# Patient Record
Sex: MALE | Race: WHITE | NOT HISPANIC OR LATINO | URBAN - METROPOLITAN AREA
[De-identification: names, ages, dates, MRNs, and addresses within clinical notes are randomized per-mention and may not be internally consistent; named-entity substitution may affect disease eponyms.]

---

## 2018-11-06 ENCOUNTER — HISTORICAL (OUTPATIENT)
Dept: ADMINISTRATIVE | Facility: HOSPITAL | Age: 58
End: 2018-11-06

## 2018-11-06 LAB
ALBUMIN SERPL-MCNC: 4.4 GM/DL (ref 3.4–5)
ALBUMIN/GLOB SERPL: 1.63 {RATIO} (ref 1.5–2.5)
ALP SERPL-CCNC: 55 UNIT/L (ref 38–126)
ALT SERPL-CCNC: 27 UNIT/L (ref 7–52)
AST SERPL-CCNC: 23 UNIT/L (ref 15–37)
BILIRUB SERPL-MCNC: 0.8 MG/DL (ref 0.2–1)
BILIRUBIN DIRECT+TOT PNL SERPL-MCNC: 0.2 MG/DL (ref 0–0.5)
BILIRUBIN DIRECT+TOT PNL SERPL-MCNC: 0.6 MG/DL
BUN SERPL-MCNC: 26 MG/DL (ref 7–18)
CALCIUM SERPL-MCNC: 9 MG/DL (ref 8.5–10)
CHLORIDE SERPL-SCNC: 102 MMOL/L (ref 98–107)
CHOLEST SERPL-MCNC: 178 MG/DL (ref 0–200)
CHOLEST/HDLC SERPL: 2.3 {RATIO}
CO2 SERPL-SCNC: 31 MMOL/L (ref 21–32)
CREAT SERPL-MCNC: 1.31 MG/DL (ref 0.6–1.3)
GLOBULIN SER-MCNC: 2.7 GM/DL (ref 1.2–3)
GLUCOSE SERPL-MCNC: 110 MG/DL (ref 74–106)
HDLC SERPL-MCNC: 78 MG/DL (ref 35–60)
LDLC SERPL CALC-MCNC: 75 MG/DL (ref 0–129)
POTASSIUM SERPL-SCNC: 4.6 MMOL/L (ref 3.5–5.1)
PROT SERPL-MCNC: 7.1 GM/DL (ref 6.4–8.2)
PSA SERPL-MCNC: 0.14 NG/ML (ref 0–3.5)
SODIUM SERPL-SCNC: 138 MMOL/L (ref 136–145)
TRIGL SERPL-MCNC: 81 MG/DL (ref 30–150)
VLDLC SERPL CALC-MCNC: 16.2 MG/DL

## 2021-05-04 LAB — CRC RECOMMENDATION EXT: NORMAL

## 2021-08-10 ENCOUNTER — HISTORICAL (OUTPATIENT)
Dept: ADMINISTRATIVE | Facility: HOSPITAL | Age: 61
End: 2021-08-10

## 2021-08-10 LAB
ALBUMIN SERPL-MCNC: 4.5 GM/DL (ref 3.4–5)
ALBUMIN/GLOB SERPL: 1.67 {RATIO} (ref 1.5–2.5)
ALP SERPL-CCNC: 65 UNIT/L (ref 38–126)
ALT SERPL-CCNC: 17 UNIT/L (ref 7–52)
AST SERPL-CCNC: 16 UNIT/L (ref 15–37)
BILIRUB SERPL-MCNC: 0.7 MG/DL (ref 0.2–1)
BILIRUBIN DIRECT+TOT PNL SERPL-MCNC: 0.2 MG/DL (ref 0–0.5)
BILIRUBIN DIRECT+TOT PNL SERPL-MCNC: 0.5 MG/DL
BUN SERPL-MCNC: 25 MG/DL (ref 7–18)
CALCIUM SERPL-MCNC: 9.8 MG/DL (ref 8.5–10.1)
CHLORIDE SERPL-SCNC: 103 MMOL/L (ref 98–107)
CO2 SERPL-SCNC: 28 MMOL/L (ref 21–32)
CREAT SERPL-MCNC: 1.41 MG/DL (ref 0.6–1.3)
EST CREAT CLEARANCE SER (OHS): 59.44 ML/MIN
GLOBULIN SER-MCNC: 2.7 GM/DL (ref 1.2–3)
GLUCOSE SERPL-MCNC: 98 MG/DL (ref 74–106)
POTASSIUM SERPL-SCNC: 4.5 MMOL/L (ref 3.5–5.1)
PROT SERPL-MCNC: 7.2 GM/DL (ref 6.4–8.2)
SODIUM SERPL-SCNC: 139 MMOL/L (ref 136–145)

## 2022-04-07 ENCOUNTER — HISTORICAL (OUTPATIENT)
Dept: ADMINISTRATIVE | Facility: HOSPITAL | Age: 62
End: 2022-04-07

## 2022-04-23 VITALS
BODY MASS INDEX: 28.04 KG/M2 | DIASTOLIC BLOOD PRESSURE: 88 MMHG | HEIGHT: 64 IN | SYSTOLIC BLOOD PRESSURE: 138 MMHG | WEIGHT: 164.25 LBS

## 2022-04-27 ENCOUNTER — HISTORICAL (OUTPATIENT)
Dept: ADMINISTRATIVE | Facility: HOSPITAL | Age: 62
End: 2022-04-27

## 2022-04-27 LAB — VIT B12 SERPL-MCNC: 816 PG/ML (ref 213–816)

## 2022-05-01 NOTE — HISTORICAL [facility] CERNER
This is a historical note converted from [place]. Formatting and pictures may have been removed.  Please reference [place] for original formatting and attached multimedia. Chief Complaint  6 month recheck  History of Present Illness  60-year-old  male presents?to office today for 6-month recheck of chronic conditions including hyperlipidemia.? Also coronary artery calcium score?less than 100.? Reports 100% compliance with all prescribed medications. ?Denies any side effects as adverse reaction/intolerance. ?Medications are working well/as intended. ?Patient ?is receiving desired effects. ?Attempt to limit excess?caloric?and simple, hydration daily dietary intake.? Reports physical activity/physical exercise?on most days.? Typically?1 hour?or more greater.  Review of Systems  ?14 point Review of Systems performed with no exceptions for new complaints other than as noted in HPI.  Physical Exam  Vitals & Measurements  HR:?78(Peripheral)? BP:?138/88?  HT:?162.56?cm? HT:?162.56?cm? WT:?74.5?kg? WT:?74.500?kg? BMI:?28.19?  Well developed, well nourished, NAD, alert and oriented x4  Vitals reviewed  Head: NCAT  Neck: supple, FROM, no LA, no thyromegaly, no bruits auscultated  CV: RRR no MRG, peripheral pulses intact  Pulmonary: Effort normal and breath sounds normal, no wheeze  Abdomen: soft, NTND, no HSM; ? NABS; no peritoneal signs ? ??  Musculoskeletal: ?no tenderness, joints wnl for acute changes, FROM, no CCE  Skin: warm, dry, intact  Neuro: no motor/sensory deficits, cranial nerves grossly intact, cerebellar function appears intact  Lymphadenopathy: no abnormalities noted  Psychiatric: Cooperative, appropriate mood and affect, appears to have normal judgment  ?  Assessment/Plan  1.?HLD (hyperlipidemia)?E78.5  Well-controlled on current medication regimen. ?Continue as prescribed.? Check fasting lipid panel?and CMP  Ordered:  Comprehensive Metabolic Panel, Routine collect, 08/10/21 7:55:00 CDT, Blood, Order for  future visit, Stop date 08/10/21 7:55:00 CDT, Lab Collect, HLD (hyperlipidemia), 08/10/21 7:55:00 CDT  Hepatic Function Panel:, Routine collect, 08/10/21 7:55:00 CDT, Blood, Order for future visit, Stop date 08/10/21 7:55:00 CDT, Lab Collect, HLD (hyperlipidemia), 08/10/21 7:55:00 CDT  Lab Collection Request, 08/10/21 7:55:00 CDT, [facility], 08/10/21 7:55:00 CDT, HLD (hyperlipidemia)  Office/Outpatient Visit Level 3 Established [id number] PC, HLD (hyperlipidemia)  Agatston CAC score, <100, [facility], 08/10/21 7:55:00 CDT  ?  2.?Agatston CAC score, <100?R93.1  ?Recommend 3-4 days of 30-45 minutes sessions of brisk walking/water aerobics/resistance training as tolerating. ?Recommend limiting excess simple carbohydrates from daily dietary regimen. recommend increase lean protein and vegetable matter.  Ordered:  Office/Outpatient Visit Level 3 Established [id number] PC, HLD (hyperlipidemia)  Agatston CAC score, <100, [facility], 08/10/21 7:55:00 CDT  ?  Orders:  Clinic Follow up, *Est. 02/10/22 3:00:00 CST, Order for future visit, Wellness examination, [facility]  Return to clinic in 6 months for wellness exam  Referrals  Clinic Follow up, *Est. 02/10/22 3:00:00 CST, Order for future visit, Wellness examination, [facility]   Problem List/Past Medical History  Ongoing  Agatston CAC score, <100  HLD (hyperlipidemia)  Lipoma of right forearm  Wellness examination  Historical  Tobacco user  Procedure/Surgical History  none   Medications  aspirin 81 mg oral tablet, CHEWABLE, 81 mg= 1 tab(s), Chewed, Daily  atorvastatin 20 mg oral tablet, See Instructions  Co Q-10, 100 mg, Oral, Daily  Allergies  No Known Medication Allergies  Social History  Abuse/Neglect  No, 08/10/2021  Alcohol  Employment/School  Employed, Work/School description: [employer]., 07/17/2018  Home/Environment  Lives with Spouse. Living situation: Home/Independent., 07/17/2018  Substance Use  Never, 07/17/2018  Tobacco  Never (less than 100 in  lifetime), N/A, 08/10/2021  Family History  Breast cancer: Sister.  Diabetes mellitus type 2: Mother.  Hypertension.: Mother and Sister.  Pancreatic cancer: Mother and Sister.  Immunizations  Vaccine Date Status   COVID-19 MRNA, LNP-S, PF- Pf[employer] 08/05/2021 Recorded   influenza virus vaccine, inactivated 11/02/2020 Given   hepatitis A adult vaccine 01/13/2020 Given   influenza virus vaccine, inactivated 10/22/2019 Given   influenza virus vaccine, inactivated 11/01/2018 Given   hepatitis A adult vaccine 04/11/2017 Recorded   tetanus/diphth/pertuss (Tdap) adult/adol 03/05/2013 Recorded   tetanus/diphth/pertuss (Tdap) adult/adol 09/24/2010 Recorded   Health Maintenance  Health Maintenance  ???Pending?(in the next year)  ??? ??OverDue  ??? ? ? ?Alcohol Misuse Screening due??01/02/21??and every 1??year(s)  ??? ??Due?  ??? ? ? ?Aspirin Therapy for CVD Prevention due??07/14/21??and every 1??year(s)  ??? ? ? ?ADL Screening due??08/10/21??and every 1??year(s)  ??? ? ? ?Depression Screening due??08/10/21??Unknown Frequency  ??? ? ? ?Zoster Vaccine due??08/10/21??Unknown Frequency  ??? ??Due In Future?  ??? ? ? ?Obesity Screening not due until??01/01/22??and every 1??year(s)  ???Satisfied?(in the past 1 year)  ??? ??Satisfied?  ??? ? ? ?Blood Pressure Screening on??08/10/21.??Satisfied by Melissa Clark LPN  ??? ? ? ?Body Mass Index Check on??08/10/21.??Satisfied by Melissa Clark LPN  ??? ? ? ?Colorectal Screening on??05/04/21.??Satisfied by Christina Waddell  ??? ? ? ?Coronary Artery Disease Maintenance-Lipid Lowering Therapy on??02/22/21.??Satisfied by Fili Gaming MD  ??? ? ? ?Diabetes Screening on??03/09/21.??Satisfied by Veronica Cartwright  ??? ? ? ?Influenza Vaccine on??11/02/20.??Satisfied by Fili Gaming MD  ??? ? ? ?Lipid Screening on??03/09/21.??Satisfied by Veronica Cartwright  ??? ? ? ?Obesity Screening on??08/10/21.??Satisfied by Melissa Clark LPN  ?      Physician?was in the building when patient was?seen and  examined by the nurse practitioner.? I concur with the?assessment/plan/management?of the patient.

## 2022-08-11 ENCOUNTER — DOCUMENTATION ONLY (OUTPATIENT)
Dept: ADMINISTRATIVE | Facility: HOSPITAL | Age: 62
End: 2022-08-11

## 2022-08-22 PROBLEM — E78.5 HYPERLIPIDEMIA: Status: ACTIVE | Noted: 2022-08-22

## 2023-08-22 PROBLEM — E55.9 VITAMIN D DEFICIENCY: Status: ACTIVE | Noted: 2023-08-22
